# Patient Record
Sex: MALE | Race: WHITE | Employment: OTHER | URBAN - METROPOLITAN AREA
[De-identification: names, ages, dates, MRNs, and addresses within clinical notes are randomized per-mention and may not be internally consistent; named-entity substitution may affect disease eponyms.]

---

## 2017-01-01 ENCOUNTER — ANESTHESIA EVENT (OUTPATIENT)
Dept: CARDIAC SURGERY | Facility: HOSPITAL | Age: 72
End: 2017-01-01

## 2017-01-01 ENCOUNTER — OFFICE VISIT (OUTPATIENT)
Dept: HEMATOLOGY/ONCOLOGY | Facility: HOSPITAL | Age: 72
End: 2017-01-01
Attending: THORACIC SURGERY (CARDIOTHORACIC VASCULAR SURGERY)
Payer: MEDICARE

## 2017-01-01 ENCOUNTER — ANESTHESIA (OUTPATIENT)
Dept: CARDIAC SURGERY | Facility: HOSPITAL | Age: 72
End: 2017-01-01

## 2017-01-01 ENCOUNTER — HOSPITAL ENCOUNTER (INPATIENT)
Facility: HOSPITAL | Age: 72
LOS: 3 days | Discharge: HOME | DRG: 164 | End: 2017-01-01
Attending: THORACIC SURGERY (CARDIOTHORACIC VASCULAR SURGERY) | Admitting: THORACIC SURGERY (CARDIOTHORACIC VASCULAR SURGERY)
Payer: MEDICARE

## 2017-01-01 ENCOUNTER — TELEPHONE (OUTPATIENT)
Dept: CARDIOLOGY UNIT | Facility: HOSPITAL | Age: 72
End: 2017-01-01

## 2017-01-01 ENCOUNTER — SURGERY (OUTPATIENT)
Age: 72
End: 2017-01-01

## 2017-01-01 VITALS
TEMPERATURE: 99 F | DIASTOLIC BLOOD PRESSURE: 72 MMHG | OXYGEN SATURATION: 95 % | WEIGHT: 214.63 LBS | RESPIRATION RATE: 18 BRPM | HEIGHT: 67.01 IN | HEART RATE: 74 BPM | SYSTOLIC BLOOD PRESSURE: 143 MMHG | BODY MASS INDEX: 33.69 KG/M2

## 2017-01-01 VITALS
RESPIRATION RATE: 18 BRPM | BODY MASS INDEX: 33.32 KG/M2 | WEIGHT: 212.31 LBS | DIASTOLIC BLOOD PRESSURE: 64 MMHG | TEMPERATURE: 98 F | OXYGEN SATURATION: 92 % | SYSTOLIC BLOOD PRESSURE: 122 MMHG | HEIGHT: 67 IN | HEART RATE: 76 BPM

## 2017-01-01 DIAGNOSIS — I97.418 INTRAOPERATIVE ARTERIAL HEMORRHAGE: ICD-10-CM

## 2017-01-01 DIAGNOSIS — R91.1 LUNG NODULE: Primary | ICD-10-CM

## 2017-01-01 PROCEDURE — 88305 TISSUE EXAM BY PATHOLOGIST: CPT | Performed by: THORACIC SURGERY (CARDIOTHORACIC VASCULAR SURGERY)

## 2017-01-01 PROCEDURE — 94640 AIRWAY INHALATION TREATMENT: CPT

## 2017-01-01 PROCEDURE — 82330 ASSAY OF CALCIUM: CPT

## 2017-01-01 PROCEDURE — 84295 ASSAY OF SERUM SODIUM: CPT

## 2017-01-01 PROCEDURE — 88307 TISSUE EXAM BY PATHOLOGIST: CPT | Performed by: THORACIC SURGERY (CARDIOTHORACIC VASCULAR SURGERY)

## 2017-01-01 PROCEDURE — 0BJ08ZZ INSPECTION OF TRACHEOBRONCHIAL TREE, VIA NATURAL OR ARTIFICIAL OPENING ENDOSCOPIC: ICD-10-PCS | Performed by: THORACIC SURGERY (CARDIOTHORACIC VASCULAR SURGERY)

## 2017-01-01 PROCEDURE — 85014 HEMATOCRIT: CPT

## 2017-01-01 PROCEDURE — 94664 DEMO&/EVAL PT USE INHALER: CPT

## 2017-01-01 PROCEDURE — 88342 IMHCHEM/IMCYTCHM 1ST ANTB: CPT | Performed by: THORACIC SURGERY (CARDIOTHORACIC VASCULAR SURGERY)

## 2017-01-01 PROCEDURE — 3E0T3BZ INTRODUCTION OF ANESTHETIC AGENT INTO PERIPHERAL NERVES AND PLEXI, PERCUTANEOUS APPROACH: ICD-10-PCS | Performed by: THORACIC SURGERY (CARDIOTHORACIC VASCULAR SURGERY)

## 2017-01-01 PROCEDURE — 80048 BASIC METABOLIC PNL TOTAL CA: CPT | Performed by: INTERNAL MEDICINE

## 2017-01-01 PROCEDURE — 0BBP4ZX EXCISION OF LEFT PLEURA, PERCUTANEOUS ENDOSCOPIC APPROACH, DIAGNOSTIC: ICD-10-PCS | Performed by: THORACIC SURGERY (CARDIOTHORACIC VASCULAR SURGERY)

## 2017-01-01 PROCEDURE — 86850 RBC ANTIBODY SCREEN: CPT | Performed by: PHYSICIAN ASSISTANT

## 2017-01-01 PROCEDURE — 0BBG4ZZ EXCISION OF LEFT UPPER LUNG LOBE, PERCUTANEOUS ENDOSCOPIC APPROACH: ICD-10-PCS | Performed by: THORACIC SURGERY (CARDIOTHORACIC VASCULAR SURGERY)

## 2017-01-01 PROCEDURE — 86901 BLOOD TYPING SEROLOGIC RH(D): CPT | Performed by: PHYSICIAN ASSISTANT

## 2017-01-01 PROCEDURE — 84132 ASSAY OF SERUM POTASSIUM: CPT

## 2017-01-01 PROCEDURE — 86920 COMPATIBILITY TEST SPIN: CPT

## 2017-01-01 PROCEDURE — 82803 BLOOD GASES ANY COMBINATION: CPT

## 2017-01-01 PROCEDURE — 88331 PATH CONSLTJ SURG 1 BLK 1SPC: CPT | Performed by: THORACIC SURGERY (CARDIOTHORACIC VASCULAR SURGERY)

## 2017-01-01 PROCEDURE — 88341 IMHCHEM/IMCYTCHM EA ADD ANTB: CPT | Performed by: THORACIC SURGERY (CARDIOTHORACIC VASCULAR SURGERY)

## 2017-01-01 PROCEDURE — 86900 BLOOD TYPING SEROLOGIC ABO: CPT | Performed by: PHYSICIAN ASSISTANT

## 2017-01-01 PROCEDURE — 85025 COMPLETE CBC W/AUTO DIFF WBC: CPT | Performed by: INTERNAL MEDICINE

## 2017-01-01 RX ORDER — SODIUM CHLORIDE 0.9 % (FLUSH) 0.9 %
10 SYRINGE (ML) INJECTION AS NEEDED
Status: DISCONTINUED | OUTPATIENT
Start: 2017-01-01 | End: 2017-01-01

## 2017-01-01 RX ORDER — CLINDAMYCIN PHOSPHATE 900 MG/50ML
INJECTION INTRAVENOUS
Status: DISCONTINUED | OUTPATIENT
Start: 2017-01-01 | End: 2017-01-01

## 2017-01-01 RX ORDER — SODIUM CHLORIDE, SODIUM LACTATE, POTASSIUM CHLORIDE, CALCIUM CHLORIDE 600; 310; 30; 20 MG/100ML; MG/100ML; MG/100ML; MG/100ML
INJECTION, SOLUTION INTRAVENOUS CONTINUOUS
Status: DISCONTINUED | OUTPATIENT
Start: 2017-01-01 | End: 2017-01-01

## 2017-01-01 RX ORDER — HEPARIN SODIUM 5000 [USP'U]/ML
5000 INJECTION, SOLUTION INTRAVENOUS; SUBCUTANEOUS EVERY 8 HOURS SCHEDULED
Status: DISCONTINUED | OUTPATIENT
Start: 2017-01-01 | End: 2017-01-01

## 2017-01-01 RX ORDER — NALOXONE HYDROCHLORIDE 0.4 MG/ML
80 INJECTION, SOLUTION INTRAMUSCULAR; INTRAVENOUS; SUBCUTANEOUS AS NEEDED
Status: DISCONTINUED | OUTPATIENT
Start: 2017-01-01 | End: 2017-01-01 | Stop reason: HOSPADM

## 2017-01-01 RX ORDER — POLYETHYLENE GLYCOL 3350 17 G/17G
17 POWDER, FOR SOLUTION ORAL DAILY PRN
Status: DISCONTINUED | OUTPATIENT
Start: 2017-01-01 | End: 2017-01-01

## 2017-01-01 RX ORDER — ASPIRIN 81 MG/1
81 TABLET ORAL DAILY
Status: DISCONTINUED | OUTPATIENT
Start: 2017-01-01 | End: 2017-01-01

## 2017-01-01 RX ORDER — SODIUM CHLORIDE 9 MG/ML
INJECTION, SOLUTION INTRAVENOUS CONTINUOUS
Status: DISCONTINUED | OUTPATIENT
Start: 2017-01-01 | End: 2017-01-01

## 2017-01-01 RX ORDER — ESCITALOPRAM OXALATE 10 MG/1
10 TABLET ORAL EVERY MORNING
Status: DISCONTINUED | OUTPATIENT
Start: 2017-01-01 | End: 2017-01-01

## 2017-01-01 RX ORDER — ONDANSETRON 2 MG/ML
4 INJECTION INTRAMUSCULAR; INTRAVENOUS EVERY 6 HOURS PRN
Status: DISCONTINUED | OUTPATIENT
Start: 2017-01-01 | End: 2017-01-01

## 2017-01-01 RX ORDER — MULTIVITAMIN
1 TABLET ORAL DAILY
COMMUNITY

## 2017-01-01 RX ORDER — OXYCODONE HYDROCHLORIDE 5 MG/1
5 TABLET ORAL EVERY 4 HOURS PRN
Status: DISCONTINUED | OUTPATIENT
Start: 2017-01-01 | End: 2017-01-01

## 2017-01-01 RX ORDER — IPRATROPIUM BROMIDE AND ALBUTEROL SULFATE 2.5; .5 MG/3ML; MG/3ML
3 SOLUTION RESPIRATORY (INHALATION)
Status: DISCONTINUED | OUTPATIENT
Start: 2017-01-01 | End: 2017-01-01

## 2017-01-01 RX ORDER — IPRATROPIUM BROMIDE AND ALBUTEROL SULFATE 2.5; .5 MG/3ML; MG/3ML
3 SOLUTION RESPIRATORY (INHALATION) EVERY 6 HOURS PRN
Status: DISCONTINUED | OUTPATIENT
Start: 2017-01-01 | End: 2017-01-01

## 2017-01-01 RX ORDER — KETOROLAC TROMETHAMINE 30 MG/ML
15 INJECTION, SOLUTION INTRAMUSCULAR; INTRAVENOUS EVERY 6 HOURS
Status: DISCONTINUED | OUTPATIENT
Start: 2017-01-01 | End: 2017-01-01

## 2017-01-01 RX ORDER — HYDROMORPHONE HYDROCHLORIDE 1 MG/ML
0.4 INJECTION, SOLUTION INTRAMUSCULAR; INTRAVENOUS; SUBCUTANEOUS EVERY 5 MIN PRN
Status: DISCONTINUED | OUTPATIENT
Start: 2017-01-01 | End: 2017-01-01 | Stop reason: HOSPADM

## 2017-01-01 RX ORDER — BUPIVACAINE HYDROCHLORIDE 2.5 MG/ML
INJECTION, SOLUTION EPIDURAL; INFILTRATION; INTRACAUDAL AS NEEDED
Status: DISCONTINUED | OUTPATIENT
Start: 2017-01-01 | End: 2017-01-01 | Stop reason: HOSPADM

## 2017-01-01 RX ORDER — MORPHINE SULFATE 2 MG/ML
2 INJECTION, SOLUTION INTRAMUSCULAR; INTRAVENOUS EVERY 4 HOURS PRN
Status: DISCONTINUED | OUTPATIENT
Start: 2017-01-01 | End: 2017-01-01

## 2017-01-01 RX ORDER — OXYCODONE HYDROCHLORIDE 5 MG/1
10 TABLET ORAL EVERY 4 HOURS PRN
Status: DISCONTINUED | OUTPATIENT
Start: 2017-01-01 | End: 2017-01-01

## 2017-01-01 RX ORDER — CALCIUM CARBONATE 200(500)MG
1000 TABLET,CHEWABLE ORAL 3 TIMES DAILY PRN
Status: DISCONTINUED | OUTPATIENT
Start: 2017-01-01 | End: 2017-01-01

## 2017-01-01 RX ORDER — DOCUSATE SODIUM 100 MG/1
100 CAPSULE, LIQUID FILLED ORAL 2 TIMES DAILY
Status: DISCONTINUED | OUTPATIENT
Start: 2017-01-01 | End: 2017-01-01

## 2017-01-01 RX ORDER — MEPERIDINE HYDROCHLORIDE 25 MG/ML
12.5 INJECTION INTRAMUSCULAR; INTRAVENOUS; SUBCUTANEOUS AS NEEDED
Status: DISCONTINUED | OUTPATIENT
Start: 2017-01-01 | End: 2017-01-01 | Stop reason: HOSPADM

## 2017-01-01 RX ORDER — ACETAMINOPHEN 10 MG/ML
1000 INJECTION, SOLUTION INTRAVENOUS EVERY 6 HOURS
Status: DISCONTINUED | OUTPATIENT
Start: 2017-01-01 | End: 2017-01-01

## 2017-01-01 RX ORDER — HYDROCODONE BITARTRATE AND ACETAMINOPHEN 10; 325 MG/1; MG/1
1 TABLET ORAL AS NEEDED
Status: DISCONTINUED | OUTPATIENT
Start: 2017-01-01 | End: 2017-01-01 | Stop reason: HOSPADM

## 2017-01-01 RX ORDER — ACETAMINOPHEN 10 MG/ML
INJECTION, SOLUTION INTRAVENOUS
Status: COMPLETED
Start: 2017-01-01 | End: 2017-01-01

## 2017-01-01 RX ORDER — BISACODYL 10 MG
10 SUPPOSITORY, RECTAL RECTAL
Status: DISCONTINUED | OUTPATIENT
Start: 2017-01-01 | End: 2017-01-01

## 2017-01-01 RX ORDER — VITAMIN E 268 MG
400 CAPSULE ORAL DAILY
COMMUNITY

## 2017-01-01 RX ORDER — CHLORAL HYDRATE 500 MG
1000 CAPSULE ORAL EVERY OTHER DAY
COMMUNITY

## 2017-01-01 RX ORDER — MIDAZOLAM HYDROCHLORIDE 1 MG/ML
1 INJECTION INTRAMUSCULAR; INTRAVENOUS EVERY 5 MIN PRN
Status: DISCONTINUED | OUTPATIENT
Start: 2017-01-01 | End: 2017-01-01 | Stop reason: HOSPADM

## 2017-01-01 RX ORDER — DEXAMETHASONE SODIUM PHOSPHATE 4 MG/ML
4 VIAL (ML) INJECTION AS NEEDED
Status: DISCONTINUED | OUTPATIENT
Start: 2017-01-01 | End: 2017-01-01 | Stop reason: HOSPADM

## 2017-01-01 RX ORDER — HEPARIN SODIUM 5000 [USP'U]/ML
5000 INJECTION, SOLUTION INTRAVENOUS; SUBCUTANEOUS ONCE
Status: DISCONTINUED | OUTPATIENT
Start: 2017-01-01 | End: 2017-01-01 | Stop reason: HOSPADM

## 2017-01-01 RX ORDER — ACETAMINOPHEN 325 MG/1
650 TABLET ORAL EVERY 6 HOURS PRN
Status: DISCONTINUED | OUTPATIENT
Start: 2017-01-01 | End: 2017-01-01

## 2017-01-01 RX ORDER — HYDROCODONE BITARTRATE AND ACETAMINOPHEN 10; 325 MG/1; MG/1
2 TABLET ORAL AS NEEDED
Status: DISCONTINUED | OUTPATIENT
Start: 2017-01-01 | End: 2017-01-01 | Stop reason: HOSPADM

## 2017-01-01 RX ORDER — ACETAMINOPHEN 325 MG/1
325 TABLET ORAL EVERY 6 HOURS PRN
Status: DISCONTINUED | OUTPATIENT
Start: 2017-01-01 | End: 2017-01-01

## 2017-01-01 RX ORDER — OXYCODONE HYDROCHLORIDE 5 MG/1
5 TABLET ORAL EVERY 4 HOURS PRN
Qty: 10 TABLET | Refills: 0 | Status: SHIPPED | OUTPATIENT
Start: 2017-01-01

## 2017-01-01 RX ORDER — ONDANSETRON 2 MG/ML
4 INJECTION INTRAMUSCULAR; INTRAVENOUS AS NEEDED
Status: DISCONTINUED | OUTPATIENT
Start: 2017-01-01 | End: 2017-01-01 | Stop reason: HOSPADM

## 2017-04-18 PROBLEM — R91.1 PULMONARY NODULE, RIGHT: Status: ACTIVE | Noted: 2017-01-01

## 2017-04-18 PROBLEM — Z85.528 HISTORY OF RENAL CELL CANCER: Status: ACTIVE | Noted: 2017-01-01

## 2017-10-17 PROBLEM — Z85.528 HISTORY OF RENAL CELL CARCINOMA: Status: ACTIVE | Noted: 2017-01-01

## 2017-10-17 PROBLEM — R91.8 LUNG MASS: Status: ACTIVE | Noted: 2017-01-01

## 2017-11-08 NOTE — H&P
Thoracic Surgery Consult    Reason for Consultation: Left upper lobe lung nodule    Consulting Physician: Dr. Nydia Zuniga    Chief Complaint: \" Lung nodule.  \"    History of Present Illness: Patient is a 81year old, male with PMH stage III renal cell carcinoma (chronic kidney disease) stage 2, GFR 60-89 ml/min 1/7/2016   • Coronary artery disease involving native coronary artery of native heart without angina pectoris 1/7/2016   • Degenerative arthritis of thumb    • Essential hypertension    • History of atrial No unusual weight loss over the last 3 months, fatigue, fevers or night sweats    Objective:    /72 (BP Location: Left arm, Patient Position: Sitting, Cuff Size: adult)   Pulse 74   Temp 98.7 °F (37.1 °C) (Tympanic)   Resp 18   Ht 5' 7.01\" (1.702 m) showed SUV of 2.2. This is nodule is concerning for either metastatic renal cell cancer or a new primary lung cancer.       Plan:  Obtain PFTs--order already placed, patient will schedule  Plan for left VATS upper lobe wedge resection, possible segmentectom

## 2017-11-18 NOTE — CONSULTS
Thoracic Surgery Consult     Reason for Consultation: Left upper lobe lung nodule     Consulting Physician: Dr. Sabrina Daugherty     Chief Complaint: \" Lung nodule.  \"     History of Present Illness: Patient is a 81year old, male with PMH stage III renal cell carci Date   • CKD (chronic kidney disease) stage 2, GFR 60-89 ml/min 1/7/2016   • Coronary artery disease involving native coronary artery of native heart without angina pectoris 1/7/2016   • Degenerative arthritis of thumb     • Essential hypertension     • Hi unusual headaches, weakness or numbness  - No chest pain  - No shortness of breath  - No dysphagia  - no leg swelling  - no unusual bone pains  - No unusual weight loss over the last 3 months, fatigue, fevers or night sweats     Objective:     /72 (B has been undergoing surveillance imaging given his cancer history. He presents today with an enlarging left lingular lung nodule. It was initially 3mm and had been seen to grow on a 6 month interval scan.  Subsequent PET scan showed that it has an SUV of

## 2017-11-20 NOTE — ANESTHESIA POSTPROCEDURE EVALUATION
[address] Patient Status:  Inpatient   Age/Gender 81year old male MRN [id number]   Location [facility] Attending Deidre Hillman, Sukhjinder Vizciano MD   Hosp Day # 0 PCP Yolette Hernandes MD       Anesthesia Pos

## 2017-11-20 NOTE — OPERATIVE REPORT
[address]    PATIENT'S NAME: Derian Jacques   ATTENDING PHYSICIAN: Goran Delgado. Juan Mojica MD   OPERATING PHYSICIAN: Goran Delgado.  Juan Mojica MD   PATIENT ACCOUNT#:   [id number]    LOCATION:  PACU [facility] PACU 9 EDWP [facility] #:   [id number]       DATE OF plugging was suctioned out. He was then placed in right lateral decubitus position. All pressure points were padded. He was prepped and draped in a sterile fashion. A time-out was performed to confirm patient, side, and site of surgery.   I made a 3 cm that I had done an appropriate operation for metastatic renal cell already. I did a multilevel intercostal nerve block, injecting 0.25% Marcaine in between each rib space 3 through 9 under direct visualization with thoracoscope.   This was done for postope

## 2017-11-20 NOTE — ANESTHESIA PREPROCEDURE EVALUATION
PRE-OP EVALUATION    Patient Name: Alexis Mejia    Pre-op Diagnosis: left lung mass    Procedure(s):   bronchoscopy, left video assisted thoracoscopic upper lobe wedge resection, possible segmentectomy    Surgeon(s) and Role:     * Mino Vega., Ruben Jacks, Neuro/Psych      (+) depression                        Left ventricle:  The cavity size is normal. Wall thickness is normal.     Systolic function is normal. The estimated ejection fraction is 55-60%.     Wall motion is normal; there are no regional wall mo Results  Component Value Date    11/14/2017   K 5.3 (H) 11/14/2017    11/14/2017   CO2 29.1 11/14/2017   BUN 35 (H) 11/14/2017   CREATSERUM 1.83 (H) 11/14/2017   GLU 87 11/14/2017   CA 9.5 11/14/2017       Lab Results  Component Value Date   IN

## 2017-11-20 NOTE — CONSULTS
Pulmonary H&P/Consult       NAME: Ajit 70: [id number]/[id number] - MRN: [id number] - Age: 81year old - :  3/20/1945    Date of Admission: 2017  6:01 AM  Admission Diagnosis: left lung mass  Reason for consult: post op mgmt    History of [facility]  No date: DRAIN PILONIDAL CYST SIMPL  No date: NEPHRECTOMY Left  No date: OTHER SURGICAL HISTORY      Comment: DNS  No date: OTHER SURGICAL HISTORY      Comment: LFT FOOT TENDON  No date: OTHER SURGICAL HISTORY      Comment: left nephrectomy   No date: /WEEK    Drug use: No    Sexual activity: Not on file     Other Topics Concern   None on file     Social History Narrative   None on file          Family History:  Family History   Problem Relation Age of Onset   • Cancer Father      bladder   • Heart Diso oxyCODONE HCl, oxyCODONE HCl     REVIEW OF SYSTEMS:   GENERAL:  feels well otherwise   SKIN:  denies any unusual skin lesions   EYES:  denies blurred vision or double vision   HEENT:  denies nasal congestion, sinus pain/tenderness  RESPIRATORY: see above enlargement/tenderness/nodules; no carotid    bruit or JVD   Back:     Symmetric, no curvature, ROM normal, no CVA tenderness   Lungs:     Clear to auscultation bilaterally, respirations unlabored   Chest wall:    No tenderness or deformity   Heart:    Reg 10:33 AM 4.3 3.6 - 4.8 g/dL Final   ----------    ASSESSMENT/PLAN:    1. Lung Nodule  -s/p lingular resection  -chest tube in place  -surgery managing chest tube  -await path-prelim favors renal cell  2.  Emphysema  -related to years of smoking  -mild  -wea

## 2017-11-20 NOTE — BRIEF OP NOTE
Pre-Operative Diagnosis: left lung mass     Post-Operative Diagnosis: Left Lung malignancy     Procedure Performed:   Procedure(s):   Flexible bronchoscopy, left video assisted thoracoscopic upper lobe wedge resection, left pleural biopsy    Surgeon(s)

## 2017-11-21 NOTE — PROGRESS NOTES
[address] [facility] Patient Status:  Inpatient    3/20/1945 MRN [id number]   [facility] 8NE-A Attending Bethany Brewer., Angel Glasgow MD   Hosp Day # 1 PCP Darin Leija MD     SUBJECTIVE: No acute events overnight.   Patient ipratropium-albuterol (DUONEB) nebulizer solution 3 mL, 3 mL, Nebulization, Q6H PRN      Physical Exam:                          General: alert, cooperative, in NAD                          HEENT: oropharynx clear without erythema or exudates, moist mucous

## 2017-11-21 NOTE — PROGRESS NOTES
THORACIC SURGERY PROGRESS NOTE  Virginia Rose is a 81year old male. MRN [id number]. Admitted 11/20/2017    [facility] EVENTS:     No acute events overnight. Awake, sitting up in chair. Tolerating general diet. Complaining of pain at chest tube site. antibiotics complete  GI: General diet   -Bowel regimen  Neuro: postoperative pain   -Oxycodone prn, tylenol scheduled  Prophylaxis: SQH, SCDs, OOBA at least 3x daily  Dispo:  Floor        Shirley Stark PA-C  Thoracic Surgery

## 2017-11-21 NOTE — CONSULTS
[facility] Hospitalist Initial Consult       Reason for consult: Medical Management sp lingular resection      History of Present Illness: Patient is a 81year old male with PMH sig for lung nodule, emphysema, renal cell cancer,   who presents sp lung nodule.    Bryson Sanderson APPENDECTOMY  2015: CABG      Comment: done at [facility]  No date: DRAIN PILONIDAL CYST SIMPL  No date: NEPHRECTOMY Left  No date: OTHER SURGICAL HISTORY      Comment: DNS  No date: OTHER SURGICAL HISTORY      Comment: LFT FOOT TENDON  No date: OTHER SURGICAL HI rashes or lesions.     Neurologic: Normal strength, no focal deficit appreciated     Laboratory:  ASSESSMENT / PLAN:    sp lingular resection of lung nodeule  - plan per CV  - CT in  Place  - check BMP and CBC    Acute on chronic pain  - cont IV narctotics

## 2017-11-21 NOTE — PROGRESS NOTES
1930- received patient resting in bed. Lethargic but oriented times 4. VSS. Afebrile. Tele shows NSR with BBB. Hr - 80's. Denies complaints of chest pain. States incisional pain is about a 4/10 and refusing any other pain medication.  \"The tylenol is doi

## 2017-11-22 NOTE — CM/SW NOTE
Unit RN concerned that pt may possibly d/c tomorrow and need home O2. Per Pt's zip code, [facility] and [employer] are two medicare approved companies that can provide home O2. Both will have intake staff available on Thanksgiving to fill orders.    [facility] ph#[phone]

## 2017-11-22 NOTE — PROGRESS NOTES
[facility] Hospitalist Progress Note                                                                   [address]  3/20/1945    SUBJECTIVE:  Pain somewhat improved from last night as is breathi needed and monitor for signs of complication     CAD w ho CABG  - cont asa, cont lopressor    Expected anemia  - follow     CKD3, w solitary kidney  - follow BMP     Prevention: SCDs, bowel regimen             [facility] Hospitalist  Pager: [phone]

## 2017-11-22 NOTE — PLAN OF CARE
Patient/Family Goals    • Patient/Family Long Term Goal Progressing    • Patient/Family Short Term Goal Progressing        RESPIRATORY - ADULT    • Achieves optimal ventilation and oxygenation Progressing        Alert and oriented times 4. VSS. Afebrile.  Valeta Rank

## 2017-11-22 NOTE — PROGRESS NOTES
Pulmonary Progress Note        NAME: Virginia Rose - ROOM: 0552/[id number]-W - MRN: [id number] - Age: 81year old - : 3/20/1945        SUBJECTIVE: No events overnight, working w/ IS    OBJECTIVE:   17  0811 17  0900 17  1127 17  1246 11/14/2017 12:51 PM 1.0 0.9 - 1.2 ratio Final   Comment:   An INR of 2.0-3.0 is the usual therapeutic interval.  Some patients (e.g., those with recurrent thrombotic events, a mechanical heart valve) may require more intense therapy with a therapeutic IN

## 2017-11-22 NOTE — PROGRESS NOTES
THORACIC SURGERY PROGRESS NOTE  Rolando Pride is a 81year old male. MRN [id number]. Admitted 11/20/2017    [address] [facility] EVENTS:     No acute events overnight. Awake, sitting up in chair. Tolerating general diet. Complaining of pain at chest tube site. resection   -Chest tube without airleak. Min drainage. Tube removed   -Duonebs; encourage cough/deep breathe; IS 10x/hr every hour he is awake   - wean O2. If can be weaned off today, can go home.   If not, plan on tomorrow after aggressive pulm toileting

## 2017-11-23 NOTE — CM/SW NOTE
MD orders received for discharge home with 02. Referral initiated to [facility] via [address]. SW spoke with liaison as well. Respiratory provided portable tank from [facility]. Pt discharged home with family and per [facility] liaison has made contact.  02 home set up arranged/conf

## 2017-11-23 NOTE — PROGRESS NOTES
Per CHINTAN Joshi (Dr. Luis Ingram): Dr. Rimam Varner will be rounding tomorrow, 11/23/17. Ok to d/c home once another 6 minute walk test is performed and order is placed for home O2, and arranged by case management. Must keep SpO2 > 90%.

## 2017-11-23 NOTE — PROGRESS NOTES
Discharge instructions reviewed with pt. Given AVS and prescription for oxycodone. Transport called for discharge.

## 2017-11-23 NOTE — PLAN OF CARE
Patient/Family Goals    • Patient/Family Long Term Goal Progressing    • Patient/Family Short Term Goal Progressing        RESPIRATORY - ADULT    • Achieves optimal ventilation and oxygenation Progressing          Assumed care of patient at [address].  Alert and

## 2017-11-23 NOTE — PROGRESS NOTES
Home O2 concentrator and portable tanks at 3 liters NC to be used with exertion (frequency)  Diagnosis: hypoxic respiratory failure   Duration of treatment: 99 months    NPI # [id number]    Renée Garcia

## 2017-11-23 NOTE — PROGRESS NOTES
6 minute oxygen walk done. Oxygen saturation at rest on room air 91%. oxygen saturation walking on room air 83%. Oxygen saturation walking on 3 Liter via nasal cannula 91%.

## 2017-11-23 NOTE — H&P
Pre-op Diagnosis: left lung mass    Outpatient consult H&P on 11/8/17    The above referenced H&P was reviewed by Perfecto Cherry MD on 11/23/2017, the patient was examined and no significant changes have occurred in the patient's condition since the

## 2017-11-23 NOTE — CM/SW NOTE
11/23/17 1500   Discharge disposition   Discharged to: Home or [address] [place] [address] services after discharge DME   HME provider [address] [facility]   Discharge transportation Private car

## 2017-11-23 NOTE — PLAN OF CARE
Patient/Family Goals    • Patient/Family Long Term Goal Progressing    • Patient/Family Short Term Goal Progressing        RESPIRATORY - ADULT    • Achieves optimal ventilation and oxygenation Progressing          Neuro: AOx4    HEENT: Impaired vision (gla

## 2017-11-23 NOTE — PROGRESS NOTES
[facility] Hospitalist Progress Note                                                                   [address]  3/20/1945    SUBJECTIVE:  Feels okay.  Denies sob although O2 sats have been lo chronic pain- resolved     CAD w ho CABG  - cont asa, cont lopressor    Expected anemia  - follow     CKD3, w solitary kidney  - follow BMP     Prevention: SCDs, bowel regimen

## 2017-11-27 NOTE — DISCHARGE SUMMARY
[facility]    Discharge Summary    Norbert Davis Patient Status:  Inpatient    3/20/1945 MRN [id number]   [facility] 8NE-A Attending No att. providers found   Hosp Day # 3 PCP Tomasz Cortez MD     Date of Admission:  Enlarging. Concern for renal met vs primary lung ca. Hospital Course:  Patient underwent left VATS wedge resection on 11/20. Final Pathology: renal cell metastasis     He did well post-operatively. He went to the general floor.     There he was able to g 400 UNITS Caps      Take 400 Units by mouth daily.    Refills:  0           Where to Get Your Medications      Please  your prescriptions at the location directed by your doctor or nurse    Bring a paper prescription for each of these medications  ·

## 2017-11-28 NOTE — CDS QUERY
Addie Fabien   1945  DOS 17-17  [id number]    [id number]  Clarification of Operative Procedure  CLINICAL DOCUMENTATION CLARIFICATION FORM  Dear Doctor Yue Duarte  Clinical information (provided below) suggests an intraoperative tear/punct

## 2017-11-29 ENCOUNTER — APPOINTMENT (OUTPATIENT)
Dept: HEMATOLOGY/ONCOLOGY | Facility: HOSPITAL | Age: 72
End: 2017-11-29
Attending: THORACIC SURGERY (CARDIOTHORACIC VASCULAR SURGERY)
Payer: MEDICARE

## (undated) DEVICE — CV PACK-LF: Brand: MEDLINE INDUSTRIES, INC.

## (undated) DEVICE — GLOVE SURG TRIUMPH SZ 6

## (undated) DEVICE — SUTURE SILK 0 FSL

## (undated) DEVICE — SYRINGE 10ML LL TIP

## (undated) DEVICE — 1010 S-DRAPE TOWEL DRAPE 10/BX: Brand: STERI-DRAPE™

## (undated) DEVICE — SUTURE VICRYL 2-0 CT-1

## (undated) DEVICE — ANCHOR TISSUE RETRIEVAL SYSTEM, BAG SIZE 1200 ML, PORT SIZE 15 MM: Brand: ANCHOR TISSUE RETRIEVAL SYSTEM

## (undated) DEVICE — COVER,MAYO STAND,STERILE: Brand: MEDLINE

## (undated) DEVICE — KENDALL SCD EXPRESS SLEEVES, KNEE LENGTH, MEDIUM: Brand: KENDALL SCD

## (undated) DEVICE — TRAY FOLEY 16F IC URINMETER

## (undated) DEVICE — GLOVE SURG TRIUMPH SZ 8

## (undated) DEVICE — BLADE ELECTRODE: Brand: EDGE

## (undated) DEVICE — Device

## (undated) DEVICE — ARYGLE SUCTION CATHETER WITH CHIMNEY VALVE STRIAGHT PACKED 14 FR/ CH: Brand: ARGYLE

## (undated) DEVICE — DERMABOND LIQUID ADHESIVE

## (undated) DEVICE — CHLORAPREP 26ML APPLICATOR

## (undated) DEVICE — STANDARD HYPODERMIC NEEDLE,POLYPROPYLENE HUB: Brand: MONOJECT

## (undated) DEVICE — ABSORBABLE HEMOSTAT (OXIDIZED REGENERATED CELLULOSE, U.S.P.): Brand: SURGICEL

## (undated) DEVICE — CATH DRAIN 28F HYDRAGLIDE XL

## (undated) DEVICE — GOWN,SURGICAL,AURORA,SLEEVE: Brand: MEDLINE

## (undated) DEVICE — 3M™ TEGADERM™ TRANSPARENT FILM DRESSING, 1626W, 4 IN X 4-3/4 IN (10 CM X 12 CM), 50 EACH/CARTON, 4 CARTON/CASE: Brand: 3M™ TEGADERM™

## (undated) DEVICE — THE ECHELON, ECHELON ENDOPATH™ AND ECHELON FLEX™ FAMILIES OF ENDOSCOPIC LINEAR CUTTERS AND RELOADS ARE STERILE, SINGLE PATIENT USE INSTRUMENTS THAT SIMULTANEOUSLY CUT AND STAPLE TISSUE. THERE ARE SIX STAGGERED ROWS OF STAPLES, THREE ON EITHER SIDE OF THE CUT LINE. THE 45 MM INSTRUMENTS HAVE A STAPLE LINE THATIS APPROXIMATELY 45 MM LONG AND A CUT LINE THAT IS APPROXIMATELY 42 MM LONG. THE SHAFT CAN ROTATE FREELY IN BOTH DIRECTIONS AND AN ARTICULATION MECHANISM ON ARTICULATING INSTRUMENTS ENABLES BENDING THE DISTAL PORTIONOF THE SHAFT TO FACILITATE LATERAL ACCESS OF THE OPERATIVE SITE.THE INSTRUMENTS ARE SHIPPED WITHOUT A RELOAD AND MUST BE LOADED PRIOR TO USE. A STAPLE RETAINING CAP ON THE RELOAD PROTECTS THE STAPLE LEG POINTS DURING SHIPPING AND TRANSPORTATION. THE INSTRUMENTS’ LOCK-OUT FEATURE IS DESIGNED TO PREVENT A USED RELOAD FROM BEING REFIRED.: Brand: ECHELON ENDOPATH

## (undated) DEVICE — STERILE POLYISOPRENE POWDER-FREE SURGICAL GLOVES: Brand: PROTEXIS

## (undated) DEVICE — SOLUTION ENDOSCOPIC ANTI-FOG NON-TOXIC NON-ABRASIVE 6 CUBIC CENTIMETER WITH RADIOPAQUE ADHESIVE-BACKED SPONGE STERILE NOT MADE WITH NATURAL RUBBER LATEX MEDICHOICE: Brand: MEDICHOICE

## (undated) DEVICE — SUTURE MONOCRYL 4-0 PS-2

## (undated) DEVICE — SOL  .9 1000ML BTL

## (undated) DEVICE — DRAIN CHEST DRY ADULT/PED

## (undated) DEVICE — GAUZE SPONGES,USP TYPE VII GAUZE, 12 PLY: Brand: CURITY

## (undated) DEVICE — 3M™ IOBAN™ 2 ANTIMICROBIAL INCISE DRAPE 6650EZ: Brand: IOBAN™ 2

## (undated) DEVICE — SYRINGE 30ML LL TIP

## (undated) DEVICE — 3M™ STERI-DRAPE™ INSTRUMENT POUCH 1018: Brand: STERI-DRAPE™

## (undated) DEVICE — 3M™ STERI-STRIP™ REINFORCED ADHESIVE SKIN CLOSURES, R1547, 1/2 IN X 4 IN (12 MM X 100 MM), 6 STRIPS/ENVELOPE: Brand: 3M™ STERI-STRIP™

## (undated) NOTE — LETTER
[facility]  Nahum Lavelljae [address] [facility], [address] [place] [address]    Consent for Operation    Date: __________________    Time: _______________    1.  I authorize the performance upon Maximilian Lawton the following operation:       bronchoscopy, left video assisted procedure has been videotaped, the surgeon will obtain the original videotape. The hospital will not be responsible for storage or maintenance of this tape.     6. For the purpose of advancing medical education, I consent to the admittance of observers to t STATEMENTS REQUIRING INSERTION OR COMPLETION WERE FILLED IN.     Signature of Patient:   ___________________________    When the patient is a minor or mentally incompetent to give consent:  Signature of person authorized to consent for patient: ____________ drugs/illegal medications). Failure to inform my anesthesiologist about these medicines may increase my risk of anesthetic complications. · If I am allergic to anything or have had a reaction to anesthesia before.     3. I understand how the anesthesia med I have discussed the procedure and information above with the patient (or patient’s representative) and answered their questions. The patient or their representative has agreed to have anesthesia services.     _______________________________________________

## (undated) NOTE — LETTER
[address] [facility] FOR BLOOD OR BLOOD COMPONENTS      In the course of your treatment, it may become necessary to administer a transfusion of blood or blood components.  This form provides basic information concerning this proc explain the alternatives to you if it has not already been done. I,Jasen Davis, have read/had read to me the above. I understand the matters bearing on the decision whether or not to authorize a transfusion of blood or blood components.  I have no quest